# Patient Record
Sex: MALE | Race: WHITE | NOT HISPANIC OR LATINO | Employment: OTHER | URBAN - METROPOLITAN AREA
[De-identification: names, ages, dates, MRNs, and addresses within clinical notes are randomized per-mention and may not be internally consistent; named-entity substitution may affect disease eponyms.]

---

## 2024-02-02 ENCOUNTER — NURSING HOME VISIT (OUTPATIENT)
Dept: POST ACUTE CARE | Facility: EXTERNAL LOCATION | Age: 64
End: 2024-02-02
Payer: MEDICARE

## 2024-02-02 DIAGNOSIS — B35.1 ONYCHOMYCOSIS: ICD-10-CM

## 2024-02-02 DIAGNOSIS — I73.9 PVD (PERIPHERAL VASCULAR DISEASE) (CMS-HCC): Primary | ICD-10-CM

## 2024-02-02 DIAGNOSIS — M79.671 FOOT PAIN, BILATERAL: ICD-10-CM

## 2024-02-02 DIAGNOSIS — M79.672 FOOT PAIN, BILATERAL: ICD-10-CM

## 2024-02-02 PROCEDURE — 99307 SBSQ NF CARE SF MDM 10: CPT | Performed by: PODIATRIST

## 2024-02-02 NOTE — LETTER
Patient: Abhijeet Parker  : 1960    Encounter Date: 2024    Subjective: Patient complains of painful nails and unable to safely trim on their own.     Objective: Vascular exam: DP and PT pulses palpable 2/4 b/l.   Capillary refill time less than 3 seconds b/l. Scant Hair growth noted to digits. No lower extremity swelling noted to b/l legs.   Skin temp warm to warm from proximal to distal b/l.    Mild Varicosities noted to feet b/l.   Neurologic: Vibratory, protective and proprioception intact b/l.     Musculoskeletal exam: Muscle strength 5/5 for all major muscle groups tested in the lower extremity b/l.     Dermatologic exam: Nails 1-5 b/l are elongated and discolored with the presence of subungual debris.   Webspaces 1-4 b/l are non macerated.   Hyperkeratotic tissue noted: Right lateral 5th digit  Open wounds noted: None    Assessment: PVD, Pain in limb, onychomycosis, pre ulcerative lesions    Plan:  Patient examined and evaluated.   Nails 1-5 b/l debrided in thickness and length  Will continue to follow   All questions answered.      Electronically Signed By: Evi Louise DPM   24  1:25 PM

## 2024-02-19 NOTE — PROGRESS NOTES
Subjective: Patient complains of painful nails and unable to safely trim on their own.     Objective: Vascular exam: DP and PT pulses palpable 2/4 b/l.   Capillary refill time less than 3 seconds b/l. Scant Hair growth noted to digits. No lower extremity swelling noted to b/l legs.   Skin temp warm to warm from proximal to distal b/l.    Mild Varicosities noted to feet b/l.   Neurologic: Vibratory, protective and proprioception intact b/l.     Musculoskeletal exam: Muscle strength 5/5 for all major muscle groups tested in the lower extremity b/l.     Dermatologic exam: Nails 1-5 b/l are elongated and discolored with the presence of subungual debris.   Webspaces 1-4 b/l are non macerated.   Hyperkeratotic tissue noted: Right lateral 5th digit  Open wounds noted: None    Assessment: PVD, Pain in limb, onychomycosis, pre ulcerative lesions    Plan:  Patient examined and evaluated.   Nails 1-5 b/l debrided in thickness and length  Will continue to follow   All questions answered.

## 2024-05-17 ENCOUNTER — NURSING HOME VISIT (OUTPATIENT)
Dept: POST ACUTE CARE | Facility: EXTERNAL LOCATION | Age: 64
End: 2024-05-17
Payer: MEDICARE

## 2024-05-17 DIAGNOSIS — M79.672 FOOT PAIN, BILATERAL: ICD-10-CM

## 2024-05-17 DIAGNOSIS — I73.9 PVD (PERIPHERAL VASCULAR DISEASE) (CMS-HCC): Primary | ICD-10-CM

## 2024-05-17 DIAGNOSIS — B35.1 ONYCHOMYCOSIS: ICD-10-CM

## 2024-05-17 DIAGNOSIS — M79.671 FOOT PAIN, BILATERAL: ICD-10-CM

## 2024-05-17 PROCEDURE — 99307 SBSQ NF CARE SF MDM 10: CPT | Performed by: PODIATRIST

## 2024-05-17 NOTE — LETTER
Patient: Abhijeet Parker  : 1960    Encounter Date: 2024    Subjective: Patient complains of painful nails and unable to safely trim on their own. In a group home. Needs foot check completed every approx 4 mos.      Objective: Vascular exam: DP and PT pulses palpable 2/4 b/l.   Capillary refill time less than 3 seconds b/l. Scant Hair growth noted to digits. No lower extremity swelling noted to b/l legs.   Skin temp warm to warm from proximal to distal b/l.    Mild Varicosities noted to feet b/l.   Neurologic: Vibratory, protective and proprioception intact b/l.      Musculoskeletal exam: Muscle strength 5/5 for all major muscle groups tested in the lower extremity b/l.     Dermatologic exam: Nails 1-5 b/l are elongated and discolored with the presence of subungual debris.   Webspaces 1-4 b/l are non macerated.   Hyperkeratotic tissue noted: Right lateral 5th digit  Open wounds noted: None     Assessment: PVD, Pain in limb, onychomycosis, pre ulcerative lesions     Plan:  Patient examined and evaluated.   Nails 1-5 b/l debrided in thickness and length  Will continue to follow   All questions answered.      Electronically Signed By: Evi Louise DPM   24  7:31 AM

## 2024-05-25 NOTE — PROGRESS NOTES
Subjective: Patient complains of painful nails and unable to safely trim on their own. In a group home. Needs foot check completed every approx 4 mos.      Objective: Vascular exam: DP and PT pulses palpable 2/4 b/l.   Capillary refill time less than 3 seconds b/l. Scant Hair growth noted to digits. No lower extremity swelling noted to b/l legs.   Skin temp warm to warm from proximal to distal b/l.    Mild Varicosities noted to feet b/l.   Neurologic: Vibratory, protective and proprioception intact b/l.      Musculoskeletal exam: Muscle strength 5/5 for all major muscle groups tested in the lower extremity b/l.     Dermatologic exam: Nails 1-5 b/l are elongated and discolored with the presence of subungual debris.   Webspaces 1-4 b/l are non macerated.   Hyperkeratotic tissue noted: Right lateral 5th digit  Open wounds noted: None     Assessment: PVD, Pain in limb, onychomycosis, pre ulcerative lesions     Plan:  Patient examined and evaluated.   Nails 1-5 b/l debrided in thickness and length  Will continue to follow   All questions answered.

## 2024-09-04 ENCOUNTER — NURSING HOME VISIT (OUTPATIENT)
Dept: POST ACUTE CARE | Facility: EXTERNAL LOCATION | Age: 64
End: 2024-09-04
Payer: MEDICARE

## 2024-09-04 DIAGNOSIS — B35.1 ONYCHOMYCOSIS: ICD-10-CM

## 2024-09-04 DIAGNOSIS — M79.671 FOOT PAIN, BILATERAL: ICD-10-CM

## 2024-09-04 DIAGNOSIS — M79.672 FOOT PAIN, BILATERAL: ICD-10-CM

## 2024-09-04 DIAGNOSIS — I73.9 PVD (PERIPHERAL VASCULAR DISEASE) (CMS-HCC): Primary | ICD-10-CM

## 2024-09-04 PROCEDURE — 99307 SBSQ NF CARE SF MDM 10: CPT | Performed by: PODIATRIST

## 2024-09-04 NOTE — LETTER
Patient: Abhijeet Parker  : 1960    Encounter Date: 2024    Subjective: Patient complains of painful nails and unable to safely trim on their own. In a group home. Needs foot check completed every approx 4 mos.      Objective: Vascular exam: DP and PT pulses palpable 2/4 b/l.   Capillary refill time less than 3 seconds b/l. Scant Hair growth noted to digits. No lower extremity swelling noted to b/l legs.   Skin temp warm to warm from proximal to distal b/l.    Mild Varicosities noted to feet b/l.   Neurologic: Vibratory, protective and proprioception intact b/l.      Musculoskeletal exam: Muscle strength 5/5 for all major muscle groups tested in the lower extremity b/l.     Dermatologic exam: Nails 1-5 b/l are elongated and discolored with the presence of subungual debris.   Webspaces 1-4 b/l are non macerated.   Hyperkeratotic tissue noted: Right lateral 5th digit  Open wounds noted: None     Assessment: PVD, Pain in limb, onychomycosis, pre ulcerative lesions     Plan:  Patient examined and evaluated.   Nails 1-5 b/l debrided in thickness and length  Will continue to follow   All questions answered.      Electronically Signed By: Evi Louise DPM   24  7:59 PM

## 2025-01-15 ENCOUNTER — NURSING HOME VISIT (OUTPATIENT)
Dept: POST ACUTE CARE | Facility: EXTERNAL LOCATION | Age: 65
End: 2025-01-15
Payer: MEDICARE

## 2025-01-15 DIAGNOSIS — M79.672 FOOT PAIN, BILATERAL: ICD-10-CM

## 2025-01-15 DIAGNOSIS — B35.1 ONYCHOMYCOSIS: ICD-10-CM

## 2025-01-15 DIAGNOSIS — M79.671 FOOT PAIN, BILATERAL: ICD-10-CM

## 2025-01-15 DIAGNOSIS — I73.9 PVD (PERIPHERAL VASCULAR DISEASE) (CMS-HCC): Primary | ICD-10-CM

## 2025-01-15 PROCEDURE — 99307 SBSQ NF CARE SF MDM 10: CPT | Performed by: PODIATRIST

## 2025-01-15 NOTE — LETTER
Patient: Abhijeet Parker  : 1960    Encounter Date: 01/15/2025    Subjective: Patient complains of painful nails and unable to safely trim on their own. In a group home. Needs foot check completed every approx 4 mos.      Objective: Vascular exam: DP and PT pulses palpable 2/4 b/l.   Capillary refill time less than 3 seconds b/l. Scant Hair growth noted to digits. No lower extremity swelling noted to b/l legs.   Skin temp warm to warm from proximal to distal b/l.    Mild Varicosities noted to feet b/l.   Neurologic: Vibratory, protective and proprioception intact b/l.      Musculoskeletal exam: Muscle strength 5/5 for all major muscle groups tested in the lower extremity b/l.     Dermatologic exam: Nails 1-5 b/l are elongated and discolored with the presence of subungual debris.   Webspaces 1-4 b/l are non macerated.   Hyperkeratotic tissue noted: Right lateral 5th digit  Open wounds noted: None     Assessment: PVD, Pain in limb, onychomycosis, pre ulcerative lesions     Plan:  Patient examined and evaluated.   Nails 1-5 b/l debrided in thickness and length  Will continue to follow   All questions answered.      Correct date of encounter 1/15/2024      Electronically Signed By: Evi Louise DPM   25  1:15 PM

## 2025-01-31 ENCOUNTER — NURSING HOME VISIT (OUTPATIENT)
Dept: POST ACUTE CARE | Facility: EXTERNAL LOCATION | Age: 65
End: 2025-01-31
Payer: MEDICARE

## 2025-01-31 DIAGNOSIS — M79.671 FOOT PAIN, BILATERAL: ICD-10-CM

## 2025-01-31 DIAGNOSIS — B35.1 ONYCHOMYCOSIS: ICD-10-CM

## 2025-01-31 DIAGNOSIS — M79.672 FOOT PAIN, BILATERAL: ICD-10-CM

## 2025-01-31 DIAGNOSIS — I73.9 PVD (PERIPHERAL VASCULAR DISEASE) (CMS-HCC): Primary | ICD-10-CM

## 2025-01-31 NOTE — LETTER
Patient: Abhijeet Parker  : 1960    Encounter Date: 2025    Subjective: Patient complains of painful nails and unable to safely trim on their own. In a group home. Needs foot check completed every approx 4 mos.      Objective: Vascular exam: DP and PT pulses palpable 2/4 b/l.   Capillary refill time less than 3 seconds b/l. Scant Hair growth noted to digits. No lower extremity swelling noted to b/l legs.   Skin temp warm to warm from proximal to distal b/l.    Mild Varicosities noted to feet b/l.   Neurologic: Vibratory, protective and proprioception intact b/l.      Musculoskeletal exam: Muscle strength 5/5 for all major muscle groups tested in the lower extremity b/l.     Dermatologic exam: Nails 1-5 b/l are elongated and discolored with the presence of subungual debris.   Webspaces 1-4 b/l are non macerated.   Hyperkeratotic tissue noted: Right lateral 5th digit  Open wounds noted: None     Assessment: PVD, Pain in limb, onychomycosis, pre ulcerative lesions     Plan:  Patient examined and evaluated.   Nails 1-5 b/l debrided in thickness and length  Will continue to follow   All questions answered.      Electronically Signed By: Evi Louise DPM   25  4:17 PM

## 2025-02-05 NOTE — PROGRESS NOTES
Subjective: Patient complains of painful nails and unable to safely trim on their own. In a group home. Needs foot check completed every approx 4 mos.      Objective: Vascular exam: DP and PT pulses palpable 2/4 b/l.   Capillary refill time less than 3 seconds b/l. Scant Hair growth noted to digits. No lower extremity swelling noted to b/l legs.   Skin temp warm to warm from proximal to distal b/l.    Mild Varicosities noted to feet b/l.   Neurologic: Vibratory, protective and proprioception intact b/l.      Musculoskeletal exam: Muscle strength 5/5 for all major muscle groups tested in the lower extremity b/l.     Dermatologic exam: Nails 1-5 b/l are elongated and discolored with the presence of subungual debris.   Webspaces 1-4 b/l are non macerated.   Hyperkeratotic tissue noted: Right lateral 5th digit  Open wounds noted: None     Assessment: PVD, Pain in limb, onychomycosis, pre ulcerative lesions     Plan:  Patient examined and evaluated.   Nails 1-5 b/l debrided in thickness and length  Will continue to follow   All questions answered.      Correct date of encounter 1/15/2024

## 2025-05-07 ENCOUNTER — NURSING HOME VISIT (OUTPATIENT)
Dept: POST ACUTE CARE | Facility: EXTERNAL LOCATION | Age: 65
End: 2025-05-07
Payer: MEDICARE

## 2025-05-07 DIAGNOSIS — M79.672 FOOT PAIN, BILATERAL: ICD-10-CM

## 2025-05-07 DIAGNOSIS — I73.9 PVD (PERIPHERAL VASCULAR DISEASE): Primary | ICD-10-CM

## 2025-05-07 DIAGNOSIS — M79.671 FOOT PAIN, BILATERAL: ICD-10-CM

## 2025-05-07 DIAGNOSIS — B35.1 ONYCHOMYCOSIS: ICD-10-CM

## 2025-05-07 PROCEDURE — 99307 SBSQ NF CARE SF MDM 10: CPT | Performed by: PODIATRIST

## 2025-05-07 NOTE — LETTER
Patient: Abhijeet Parker  : 1960    Encounter Date: 2025    Subjective: Patient complains of painful nails and unable to safely trim on their own. In a group home. Needs foot check completed every approx 4 mos.      Objective: Vascular exam: DP and PT pulses palpable 2/4 b/l.   Capillary refill time less than 3 seconds b/l. Scant Hair growth noted to digits. No lower extremity swelling noted to b/l legs.   Skin temp warm to warm from proximal to distal b/l.    Mild Varicosities noted to feet b/l.   Neurologic: Vibratory, protective and proprioception intact b/l.      Musculoskeletal exam: Muscle strength 5/5 for all major muscle groups tested in the lower extremity b/l.     Dermatologic exam: Nails 1-5 b/l are elongated and discolored with the presence of subungual debris.   Webspaces 1-4 b/l are non macerated.   Hyperkeratotic tissue noted: Right lateral 5th digit  Open wounds noted: None     Assessment: PVD, Pain in limb, onychomycosis, pre ulcerative lesions     Plan:  Patient examined and evaluated.   Nails 1-5 b/l debrided in thickness and length  Will continue to follow   All questions answered.    Electronically Signed By: Evi Louise DPM   25 11:01 AM